# Patient Record
Sex: MALE | Race: WHITE | URBAN - METROPOLITAN AREA
[De-identification: names, ages, dates, MRNs, and addresses within clinical notes are randomized per-mention and may not be internally consistent; named-entity substitution may affect disease eponyms.]

---

## 2022-01-06 ENCOUNTER — OFFICE VISIT (OUTPATIENT)
Dept: URBAN - METROPOLITAN AREA CLINIC 16 | Facility: CLINIC | Age: 72
End: 2022-01-06
Payer: COMMERCIAL

## 2022-01-06 DIAGNOSIS — H10.9 CONJUNCTIVITIS: Primary | ICD-10-CM

## 2022-01-06 PROCEDURE — 99203 OFFICE O/P NEW LOW 30 MIN: CPT | Performed by: OPTOMETRIST

## 2022-01-06 RX ORDER — NEOMYCIN SULFATE, POLYMYXIN B SULFATE AND DEXAMETHASONE 3.5; 10000; 1 MG/ML; [USP'U]/ML; MG/ML
SUSPENSION OPHTHALMIC
Qty: 5 | Refills: 0 | Status: ACTIVE
Start: 2022-01-06

## 2022-01-06 ASSESSMENT — INTRAOCULAR PRESSURE
OD: 10
OS: 14

## 2022-01-06 NOTE — IMPRESSION/PLAN
Impression: Anatomical narrow angle, bilateral: H40.033. Plan: The patient's eye(s) are at risk of angle closure due to narrow anatomical angles. Discussed risks of angle closure to vision and recommended LPI eval/Tx with surgeon. Discussed symptoms of angle closure and instructed patient to notify clinic immediately if they occur prior to treatment. Refer for consult w/glaucoma specialist, soonest available.

## 2022-01-06 NOTE — IMPRESSION/PLAN
Impression: Conjunctivitis: H10.9. Plan: Discussed condition w/pt. Begin maxitrol 1gtt TID OD.  
RTC for follow up in 1 week, w/glaucoma specialist.

## 2022-01-26 ENCOUNTER — OFFICE VISIT (OUTPATIENT)
Dept: URBAN - METROPOLITAN AREA CLINIC 23 | Facility: CLINIC | Age: 72
End: 2022-01-26
Payer: MEDICARE

## 2022-01-26 DIAGNOSIS — H40.033 ANATOMICAL NARROW ANGLE, BILATERAL: Primary | ICD-10-CM

## 2022-01-26 DIAGNOSIS — H25.13 AGE-RELATED NUCLEAR CATARACT, BILATERAL: ICD-10-CM

## 2022-01-26 PROCEDURE — 92020 GONIOSCOPY: CPT | Performed by: OPHTHALMOLOGY

## 2022-01-26 PROCEDURE — 92133 CPTRZD OPH DX IMG PST SGM ON: CPT | Performed by: OPHTHALMOLOGY

## 2022-01-26 PROCEDURE — 76514 ECHO EXAM OF EYE THICKNESS: CPT | Performed by: OPHTHALMOLOGY

## 2022-01-26 PROCEDURE — 92083 EXTENDED VISUAL FIELD XM: CPT | Performed by: OPHTHALMOLOGY

## 2022-01-26 PROCEDURE — 99204 OFFICE O/P NEW MOD 45 MIN: CPT | Performed by: OPHTHALMOLOGY

## 2022-01-26 RX ORDER — PREDNISOLONE ACETATE 10 MG/ML
1 % SUSPENSION/ DROPS OPHTHALMIC
Qty: 5 | Refills: 0 | Status: INACTIVE
Start: 2022-01-26 | End: 2022-02-15

## 2022-01-26 ASSESSMENT — INTRAOCULAR PRESSURE
OD: 12
OS: 14

## 2022-01-26 NOTE — IMPRESSION/PLAN
Impression: Anatomical narrow angle, bilateral: H40.033. CCT average OU Luis Sanford 74 WNL OU
IOP WNL OU Plan: Discussed diagnosis, at risk for AACG, IOP/ONH/Glaucoma management and risks, explained and understood. OCT ordered and reviewed today. Recommend LPI OU to prevent a sudden attack of glaucoma. Advised patient to avoid certain medications that might dilate the pupils until LPI is done. Discussed RBA's and laser procedure. Patient elects LPI OU. RL=2.  Start prednisolone qid x 7 days after laser

## 2022-02-15 ENCOUNTER — OFFICE VISIT (OUTPATIENT)
Dept: URBAN - METROPOLITAN AREA CLINIC 23 | Facility: CLINIC | Age: 72
End: 2022-02-15
Payer: MEDICARE

## 2022-02-15 DIAGNOSIS — H20.011 PRIMARY IRIDOCYCLITIS, RIGHT EYE: Primary | ICD-10-CM

## 2022-02-15 PROCEDURE — 99213 OFFICE O/P EST LOW 20 MIN: CPT | Performed by: OPTOMETRIST

## 2022-02-15 RX ORDER — PREDNISOLONE ACETATE 10 MG/ML
1 % SUSPENSION/ DROPS OPHTHALMIC
Qty: 5 | Refills: 0 | Status: ACTIVE
Start: 2022-02-15

## 2022-02-15 ASSESSMENT — INTRAOCULAR PRESSURE: OD: 10

## 2022-02-15 NOTE — IMPRESSION/PLAN
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 Condition: unstable. Plan: Discussed findings. Inflammation accounts for patient's complaints. Prescribed Prednisolone Q2H while awake. Recommend rescheduling LPI until after iritis has healed. Continue to monitor, patient to call immediately if symptoms or pain increases.

## 2022-02-18 ENCOUNTER — OFFICE VISIT (OUTPATIENT)
Dept: URBAN - METROPOLITAN AREA CLINIC 23 | Facility: CLINIC | Age: 72
End: 2022-02-18
Payer: MEDICARE

## 2022-02-18 PROCEDURE — 99213 OFFICE O/P EST LOW 20 MIN: CPT | Performed by: OPTOMETRIST

## 2022-02-18 NOTE — IMPRESSION/PLAN
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 Condition: improving. Plan: Discussed findings. Inflammation improving, patient to begin taper of Prednisolone, QID OD x 1 week, TID OD x 1 week, BID OD x 1 week, QD OD x 1 week, then d/c. Patient can proceed with LPI in one month once patient has d/c Prednisolone. Recommend patient take Prednisolone x 2 weeks after laser. Call if symptoms worsen.

## 2022-04-06 ENCOUNTER — SURGERY (OUTPATIENT)
Dept: URBAN - METROPOLITAN AREA SURGERY 11 | Facility: SURGERY | Age: 72
End: 2022-04-06
Payer: MEDICARE

## 2022-04-26 ENCOUNTER — POST-OPERATIVE VISIT (OUTPATIENT)
Dept: URBAN - METROPOLITAN AREA CLINIC 16 | Facility: CLINIC | Age: 72
End: 2022-04-26
Payer: MEDICARE

## 2022-04-26 DIAGNOSIS — Z48.810 ENCOUNTER FOR SURGICAL AFTERCARE FOLLOWING SURGERY ON A SENSE ORGAN: Primary | ICD-10-CM

## 2022-04-26 PROCEDURE — 99024 POSTOP FOLLOW-UP VISIT: CPT | Performed by: OPTOMETRIST

## 2022-04-26 ASSESSMENT — INTRAOCULAR PRESSURE
OS: 11
OD: 11

## 2022-04-26 NOTE — IMPRESSION/PLAN
Impression: S/P LPI (Laser Peripheral Iridotomy) OU - 20 Days. Encounter for surgical aftercare following surgery on a sense organ  Z48.810. Plan: RTC 6-12 months x CFM. --Advised patient to use artificial tears for comfort.

## 2022-04-28 ENCOUNTER — OFFICE VISIT (OUTPATIENT)
Dept: URBAN - METROPOLITAN AREA CLINIC 16 | Facility: CLINIC | Age: 72
End: 2022-04-28
Payer: MEDICARE

## 2022-04-28 PROCEDURE — 99024 POSTOP FOLLOW-UP VISIT: CPT | Performed by: OPTOMETRIST

## 2022-04-28 RX ORDER — PREDNISOLONE ACETATE 10 MG/ML
1 % SUSPENSION/ DROPS OPHTHALMIC
Qty: 5 | Refills: 0 | Status: ACTIVE
Start: 2022-04-28

## 2022-04-28 ASSESSMENT — INTRAOCULAR PRESSURE
OD: 12
OS: 12

## 2022-04-28 NOTE — IMPRESSION/PLAN
Impression: Acute iridocyclitis: H20.00.
s/p LPI OU. Plan: Begin Pred acetate QID OD x 1 week, then BID x 1 week, then QD x 1 week. RTC 1-2 weeks x follow up.

## 2022-05-12 ENCOUNTER — OFFICE VISIT (OUTPATIENT)
Dept: URBAN - METROPOLITAN AREA CLINIC 16 | Facility: CLINIC | Age: 72
End: 2022-05-12
Payer: MEDICARE

## 2022-05-12 DIAGNOSIS — H20.00 ACUTE IRIDOCYCLITIS: Primary | ICD-10-CM

## 2022-05-12 PROCEDURE — 99212 OFFICE O/P EST SF 10 MIN: CPT | Performed by: OPTOMETRIST

## 2022-05-12 ASSESSMENT — INTRAOCULAR PRESSURE
OS: 14
OD: 14

## 2022-05-12 NOTE — IMPRESSION/PLAN
Impression: Acute iridocyclitis: H20.00.
s/p LPI OU. Plan: Condition improved. Finish pred acetate QD x 1 week. RTC 6-8 months x CFM.